# Patient Record
Sex: FEMALE | Race: WHITE | Employment: FULL TIME | URBAN - NONMETROPOLITAN AREA
[De-identification: names, ages, dates, MRNs, and addresses within clinical notes are randomized per-mention and may not be internally consistent; named-entity substitution may affect disease eponyms.]

---

## 2014-12-12 LAB — DIABETIC RETINOPATHY: NEGATIVE

## 2018-02-22 ENCOUNTER — TELEPHONE (OUTPATIENT)
Dept: FAMILY MEDICINE CLINIC | Age: 33
End: 2018-02-22

## 2018-02-22 RX ORDER — OSELTAMIVIR PHOSPHATE 75 MG/1
75 CAPSULE ORAL DAILY
Qty: 10 CAPSULE | Refills: 0 | Status: SHIPPED | OUTPATIENT
Start: 2018-02-22 | End: 2018-03-04

## 2018-02-22 NOTE — TELEPHONE ENCOUNTER
Pt. Called and stated her daughter was diagnosed with the flu and has been taking tamiflu. She was concerned because her daughter has been coughing a lot and vomited on her while she was caring for her. She stated that she heard there was a preventative medication for this and would like to know if something could be called in for her to prevent her from getting the flu. Please advise.

## 2018-03-07 ENCOUNTER — OFFICE VISIT (OUTPATIENT)
Dept: FAMILY MEDICINE CLINIC | Age: 33
End: 2018-03-07

## 2018-03-07 VITALS
SYSTOLIC BLOOD PRESSURE: 116 MMHG | RESPIRATION RATE: 14 BRPM | OXYGEN SATURATION: 98 % | HEIGHT: 65 IN | BODY MASS INDEX: 30.26 KG/M2 | HEART RATE: 82 BPM | DIASTOLIC BLOOD PRESSURE: 74 MMHG | WEIGHT: 181.6 LBS

## 2018-03-07 DIAGNOSIS — E10.9 DM W/O COMPLICATION TYPE I (HCC): Primary | Chronic | ICD-10-CM

## 2018-03-07 DIAGNOSIS — Z00.00 ROUTINE HEALTH MAINTENANCE: ICD-10-CM

## 2018-03-07 LAB
CREATININE URINE POCT: NORMAL
HBA1C MFR BLD: 6.8 %
MICROALBUMIN/CREAT 24H UR: NORMAL MG/G{CREAT}
MICROALBUMIN/CREAT UR-RTO: NORMAL

## 2018-03-07 PROCEDURE — 83036 HEMOGLOBIN GLYCOSYLATED A1C: CPT | Performed by: NURSE PRACTITIONER

## 2018-03-07 PROCEDURE — 99395 PREV VISIT EST AGE 18-39: CPT | Performed by: NURSE PRACTITIONER

## 2018-03-07 PROCEDURE — 82044 UR ALBUMIN SEMIQUANTITATIVE: CPT | Performed by: NURSE PRACTITIONER

## 2018-03-07 RX ORDER — LISINOPRIL 5 MG/1
5 TABLET ORAL DAILY
Qty: 30 TABLET | Refills: 3 | Status: SHIPPED | OUTPATIENT
Start: 2018-03-07 | End: 2019-09-08 | Stop reason: ALTCHOICE

## 2018-03-07 RX ORDER — ATORVASTATIN CALCIUM 10 MG/1
10 TABLET, FILM COATED ORAL DAILY
Qty: 30 TABLET | Refills: 3 | Status: SHIPPED | OUTPATIENT
Start: 2018-03-07 | End: 2019-09-08 | Stop reason: ALTCHOICE

## 2018-03-07 RX ORDER — PEN NEEDLE, DIABETIC 29 G X1/2"
NEEDLE, DISPOSABLE MISCELLANEOUS
Refills: 3 | COMMUNITY
Start: 2018-02-22

## 2018-03-07 ASSESSMENT — PATIENT HEALTH QUESTIONNAIRE - PHQ9
2. FEELING DOWN, DEPRESSED OR HOPELESS: 0
SUM OF ALL RESPONSES TO PHQ9 QUESTIONS 1 & 2: 0
1. LITTLE INTEREST OR PLEASURE IN DOING THINGS: 0
SUM OF ALL RESPONSES TO PHQ QUESTIONS 1-9: 0

## 2018-03-07 ASSESSMENT — ENCOUNTER SYMPTOMS
COUGH: 0
ABDOMINAL PAIN: 0
SHORTNESS OF BREATH: 0

## 2018-03-08 NOTE — ASSESSMENT & PLAN NOTE
Dx 2001. Sees Dr. Karl Borrego at [facility]--appointments every 3-6 months. A1c 6.8. Appointment with endocrinology this week.   Last eye exam through [facility] in October 2017

## 2018-08-01 NOTE — PROGRESS NOTES
Pap results were obtained-scanned to chart-health maintenance  Faxed to [facility] they replied-patient is not there patient according to response
Scanned eye exam to chart-health maintenance updated-faxed to [facility] records-request for pap
mis-transcribed.)

## 2019-03-22 LAB
AVERAGE GLUCOSE: NORMAL
HBA1C MFR BLD: 7.4 %

## 2019-09-08 ENCOUNTER — HOSPITAL ENCOUNTER (EMERGENCY)
Age: 34
Discharge: HOME | End: 2019-09-08
Attending: EMERGENCY MEDICINE
Payer: COMMERCIAL

## 2019-09-08 VITALS
HEIGHT: 65 IN | SYSTOLIC BLOOD PRESSURE: 133 MMHG | HEART RATE: 100 BPM | BODY MASS INDEX: 28.32 KG/M2 | RESPIRATION RATE: 18 BRPM | DIASTOLIC BLOOD PRESSURE: 84 MMHG | WEIGHT: 170 LBS | OXYGEN SATURATION: 98 % | TEMPERATURE: 98.2 F

## 2019-09-08 DIAGNOSIS — O20.9 VAGINAL BLEEDING BEFORE 22 WEEKS GESTATION: Primary | ICD-10-CM

## 2019-09-08 LAB
ALBUMIN SERPL-MCNC: 3.5 GM/DL (ref 3.4–5)
ALP BLD-CCNC: 73 IU/L (ref 40–129)
ALT SERPL-CCNC: 5 U/L (ref 10–40)
ANION GAP SERPL CALCULATED.3IONS-SCNC: 16 MMOL/L (ref 4–16)
ANION GAP SERPL CALCULATED.3IONS-SCNC: ABNORMAL MMOL/L (ref 4–16)
AST SERPL-CCNC: 29 IU/L (ref 15–37)
BASOPHILS ABSOLUTE: 0.1 K/CU MM
BASOPHILS RELATIVE PERCENT: 0.5 % (ref 0–1)
BILIRUB SERPL-MCNC: 0.3 MG/DL (ref 0–1)
BUN BLDV-MCNC: 7 MG/DL (ref 6–23)
CALCIUM SERPL-MCNC: 8.7 MG/DL (ref 8.3–10.6)
CHLORIDE BLD-SCNC: 103 MMOL/L (ref 99–110)
CO2: 17 MMOL/L (ref 21–32)
CO2: ABNORMAL MMOL/L (ref 21–32)
CREAT SERPL-MCNC: 0.6 MG/DL (ref 0.6–1.1)
DIFFERENTIAL TYPE: ABNORMAL
EOSINOPHILS ABSOLUTE: 0.2 K/CU MM
EOSINOPHILS RELATIVE PERCENT: 1.9 % (ref 0–3)
GFR AFRICAN AMERICAN: >60 ML/MIN/1.73M2
GFR NON-AFRICAN AMERICAN: >60 ML/MIN/1.73M2
GLUCOSE BLD-MCNC: 108 MG/DL (ref 70–99)
GONADOTROPIN, CHORIONIC (HCG) QUANT: NORMAL UIU/ML
HCT VFR BLD CALC: 37.5 % (ref 37–47)
HEMOGLOBIN: 12 GM/DL (ref 12.5–16)
IMMATURE NEUTROPHIL %: 0.4 % (ref 0–0.43)
LYMPHOCYTES ABSOLUTE: 2.1 K/CU MM
LYMPHOCYTES RELATIVE PERCENT: 19.6 % (ref 24–44)
MCH RBC QN AUTO: 27.1 PG (ref 27–31)
MCHC RBC AUTO-ENTMCNC: 32 % (ref 32–36)
MCV RBC AUTO: 84.7 FL (ref 78–100)
MONOCYTES ABSOLUTE: 0.6 K/CU MM
MONOCYTES RELATIVE PERCENT: 6.1 % (ref 0–4)
PDW BLD-RTO: 13.2 % (ref 11.7–14.9)
PLATELET # BLD: 226 K/CU MM (ref 140–440)
PMV BLD AUTO: 10.6 FL (ref 7.5–11.1)
POTASSIUM SERPL-SCNC: 3.9 MMOL/L (ref 3.5–5.1)
RBC # BLD: 4.43 M/CU MM (ref 4.2–5.4)
SEGMENTED NEUTROPHILS ABSOLUTE COUNT: 7.5 K/CU MM
SEGMENTED NEUTROPHILS RELATIVE PERCENT: 71.5 % (ref 36–66)
SODIUM BLD-SCNC: 136 MMOL/L (ref 135–145)
TOTAL IMMATURE NEUTOROPHIL: 0.04 K/CU MM
TOTAL PROTEIN: 6.5 GM/DL (ref 6.4–8.2)
WBC # BLD: 10.5 K/CU MM (ref 4–10.5)

## 2019-09-08 PROCEDURE — 99284 EMERGENCY DEPT VISIT MOD MDM: CPT

## 2019-09-08 PROCEDURE — 2580000003 HC RX 258: Performed by: EMERGENCY MEDICINE

## 2019-09-08 PROCEDURE — 85025 COMPLETE CBC W/AUTO DIFF WBC: CPT

## 2019-09-08 PROCEDURE — 80053 COMPREHEN METABOLIC PANEL: CPT

## 2019-09-08 PROCEDURE — 84702 CHORIONIC GONADOTROPIN TEST: CPT

## 2019-09-08 RX ORDER — 0.9 % SODIUM CHLORIDE 0.9 %
500 INTRAVENOUS SOLUTION INTRAVENOUS ONCE
Status: COMPLETED | OUTPATIENT
Start: 2019-09-08 | End: 2019-09-08

## 2019-09-08 RX ADMIN — SODIUM CHLORIDE 500 ML: 9 INJECTION, SOLUTION INTRAVENOUS at 11:02

## 2019-09-08 NOTE — ED NOTES
Pelvic exam done. Pt tolerated well.  remained at bedside.  IVF infusing without any difficulty     Zully Rhodes RN  09/08/19 0021

## 2020-01-24 LAB
AVERAGE GLUCOSE: NORMAL
HBA1C MFR BLD: 5 %

## 2020-11-30 ENCOUNTER — TELEPHONE (OUTPATIENT)
Dept: FAMILY MEDICINE CLINIC | Age: 35
End: 2020-11-30

## 2020-11-30 NOTE — TELEPHONE ENCOUNTER
Please clarify. Is she concerned with postpartum depression? She can schedule an appointment with me for evaluation of postpartum depression. Thank you.

## 2020-11-30 NOTE — TELEPHONE ENCOUNTER
Patient states he delivered a baby 6 months, and is experience \"Postpardum\" . Gyno told her to see her PCP. I was unsure if you want me to schedule or refer back to gyno.     Please Celi Barriga

## 2020-12-03 ENCOUNTER — VIRTUAL VISIT (OUTPATIENT)
Dept: FAMILY MEDICINE CLINIC | Age: 35
End: 2020-12-03
Payer: COMMERCIAL

## 2020-12-03 PROBLEM — F41.8 POSTPARTUM ANXIETY: Status: ACTIVE | Noted: 2020-12-03

## 2020-12-03 PROCEDURE — 99213 OFFICE O/P EST LOW 20 MIN: CPT | Performed by: NURSE PRACTITIONER

## 2020-12-03 RX ORDER — VITAMIN C, CALCIUM, IRON, VITAMIN D3, VITAMIN E, THIAMIN, RIBOFLAVIN, NIACINAMIDE, VITAMIN B6, FOLIC ACID, IODINE, ZINC, COPPER, DOCUSATE SODIUM 120; 124; 27; 400; 30; 3; 3.4; 20; 20; 1; 150; 25; 2 MG/1; MG/1; MG/1; [IU]/1; [IU]/1; MG/1; MG/1; MG/1; MG/1; MG/1; MG/1; MG/1; MG/1
1 TABLET ORAL DAILY
COMMUNITY

## 2020-12-03 RX ORDER — SERTRALINE HYDROCHLORIDE 25 MG/1
25 TABLET, FILM COATED ORAL DAILY
Qty: 30 TABLET | Refills: 3 | Status: SHIPPED | OUTPATIENT
Start: 2020-12-03 | End: 2021-04-12 | Stop reason: SDUPTHER

## 2020-12-03 ASSESSMENT — ANXIETY QUESTIONNAIRES
4. TROUBLE RELAXING: 1-SEVERAL DAYS
GAD7 TOTAL SCORE: 7
5. BEING SO RESTLESS THAT IT IS HARD TO SIT STILL: 0-NOT AT ALL
1. FEELING NERVOUS, ANXIOUS, OR ON EDGE: 2-OVER HALF THE DAYS
6. BECOMING EASILY ANNOYED OR IRRITABLE: 1-SEVERAL DAYS
3. WORRYING TOO MUCH ABOUT DIFFERENT THINGS: 1-SEVERAL DAYS
2. NOT BEING ABLE TO STOP OR CONTROL WORRYING: 1-SEVERAL DAYS
7. FEELING AFRAID AS IF SOMETHING AWFUL MIGHT HAPPEN: 1-SEVERAL DAYS

## 2020-12-03 ASSESSMENT — PATIENT HEALTH QUESTIONNAIRE - PHQ9
SUM OF ALL RESPONSES TO PHQ QUESTIONS 1-9: 2
2. FEELING DOWN, DEPRESSED OR HOPELESS: 2
1. LITTLE INTEREST OR PLEASURE IN DOING THINGS: 0
SUM OF ALL RESPONSES TO PHQ9 QUESTIONS 1 & 2: 2
SUM OF ALL RESPONSES TO PHQ QUESTIONS 1-9: 2
SUM OF ALL RESPONSES TO PHQ QUESTIONS 1-9: 2

## 2020-12-03 ASSESSMENT — ENCOUNTER SYMPTOMS
COUGH: 0
NAUSEA: 0
CONSTIPATION: 0
ABDOMINAL PAIN: 0
VOMITING: 0
SHORTNESS OF BREATH: 0
WHEEZING: 0
CHEST TIGHTNESS: 0
DIARRHEA: 0

## 2020-12-03 NOTE — Clinical Note
Lab work through endocrinology at [facility] we abstract? Supposed to complete in the next week. Thank you.

## 2020-12-03 NOTE — PROGRESS NOTES
12/3/2020 3/7/2018 7/22/2016   PHQ2 Score 2 0 0   PHQ9 Score 2 0 0     Interpretation of Total Score Depression Severity: 1-4 = Minimal depression, 5-9 = Mild depression, 10-14 = Moderate depression, 15-19 = Moderately severe depression, 20-27 = Severe depression  MANJULA 7 SCORE 12/3/2020   MANJULA-7 Total Score 7     Interpretation of MANJULA-7 score: 5-9 = mild anxiety, 10-14 = moderate anxiety, 15+ = severe anxiety. Recommend referral to behavioral health for scores 10 or greater. She is a type 1 diabetic and noticed her glucose levels will increase with stress and anxiety. She is followed by endocrinology. Last appointment was two weeks ago and is supposed to complete lab work. No problem-specific Assessment & Plan notes found for this encounter. Review of Systems   Constitutional: Negative for appetite change, chills, fatigue and unexpected weight change. Respiratory: Negative for cough, chest tightness, shortness of breath and wheezing. Cardiovascular: Negative for chest pain, palpitations and leg swelling. Gastrointestinal: Negative for abdominal pain, constipation, diarrhea, nausea and vomiting. Musculoskeletal: Negative for arthralgias. Neurological: Negative for weakness, numbness and headaches. Hematological: Negative for adenopathy. Psychiatric/Behavioral: Positive for dysphoric mood. Negative for sleep disturbance and suicidal ideas. The patient is nervous/anxious. Prior to Visit Medications    Medication Sig Taking?  Authorizing Provider   sertraline (ZOLOFT) 25 MG tablet Take 1 tablet by mouth daily Yes Lisa Bui, APRN - CNP   HUMALOG 100 UNIT/ML injection vial UP TO 50 UNITS IN DIVIDED DOSES Yes Historical Provider, MD   insulin glargine (LANTUS) 100 UNIT/ML injection vial Inject 25 Units into the skin nightly Yes Historical Provider, MD   Prenamber w/o H-GkLb-UqUj-DSS-FA (CITRANATAL RX) 27-1 MG TABS Take 1 tablet by mouth daily  Historical Provider, MD   Prenatal Vit-Fe Fumarate-FA (PRENATAL VITAMIN PO) Take by mouth  Historical Provider, MD   BD INSULIN SYRINGE ULTRAFINE 31G X 5/16\" 0.3 ML MISC 1 SYRINGE BY SUBCUTANEOUS ROUTE 5 TIMES DAILY. . BD BRAND  Historical Provider, MD   glucose blood VI test strips (FREESTYLE LITE) strip 4 strips by Does not apply route  Historical Provider, MD   glucose blood VI test strips (ONE TOUCH ULTRA TEST) strip Testing 6 times a day  Historical Provider, MD   Insulin Syringes, Disposable, U-100 0.3 ML MISC Inject 1 Syringe into the skin  Historical ProviderMD Richelle LANCETS FINE [address] [place] 1 each into the skin  Historical Provider, MD       No Known Allergies    Social History     Tobacco Use    Smoking status: Never Smoker    Smokeless tobacco: Never Used   Substance Use Topics    Alcohol use: No    Drug use: No      Past Medical History:   Diagnosis Date    Allergic rhinitis     Asthma     Diabetes mellitus type 1, controlled ([address]      Past Surgical History:   Procedure Laterality Date    WISDOM TOOTH EXTRACTION           PHYSICAL EXAMINATION:    Vital Signs: (As obtained by patient/caregiver or practitioner observation)    Blood pressure-  Heart rate-    Respiratory rate-    Temperature-  Pulse oximetry-     Physical Exam  Constitutional:       Appearance: Normal appearance. She is not ill-appearing. HENT:      Head: Normocephalic and atraumatic. Right Ear: Hearing and external ear normal.      Left Ear: Hearing and external ear normal.      Mouth/Throat:      Mouth: Mucous membranes are moist.   Eyes:      Extraocular Movements: Extraocular movements intact. Neck:      Musculoskeletal: Normal range of motion. Pulmonary:      Effort: Pulmonary effort is normal.      Comments: No visualized signs of difficulty breathing  Skin:     Comments: No significant exanthematous lesions or discoloration noted on facial skin   Neurological:      Mental Status: She is alert and oriented to person, place, and time. Cranial Nerves: No facial asymmetry. Psychiatric:         Mood and Affect: Affect normal. Mood is anxious. Speech: Speech normal.         Behavior: Behavior is cooperative. Thought Content: Thought content normal. Thought content does not include homicidal or suicidal ideation. Thought content does not include homicidal or suicidal plan. Other pertinent observable physical exam findings-     Due to this being a TeleHealth encounter, evaluation of the following organ systems is limited: Vitals/Constitutional/EENT/Resp/CV/GI//MS/Neuro/Skin/Heme-Lymph-Imm. ASSESSMENT/PLAN:  1. DM w/o complication type I ([address]  Complete lab work per endocrinology    2. Postpartum anxiety  Start zoloft. Will fill out FMLA for half days again. Continue counseling. Follow up in one month. Earlier PRN. - sertraline (ZOLOFT) 25 MG tablet; Take 1 tablet by mouth daily  Dispense: 30 tablet; Refill: 3      Return in about 1 month (around 1/3/2021), or if symptoms worsen or fail to improve. An  electronic signature was used to authenticate this note. --MARYBETH Fulton - CNP on 12/3/2020 at 3:54 PM             Pursuant to the emergency declaration under the [address] [facility] waiver authority and the [employer] Act, this Virtual  Visit was conducted, with patient's consent, to reduce the patient's risk of exposure to COVID-19 and provide necessary medical care. The patient (and/or legal guardian) has also been advised to contact this office for worsening conditions or problems, and seek emergency medical treatment and/or call 911 if deemed necessary. Services were provided through a video synchronous discussion virtually to substitute for in-person clinic visit.         (Please note that portions of this note may have been completed with a voice recognition program. Efforts were made to edit the dictations but occasionally words aremis-transcribed.)

## 2020-12-03 NOTE — LETTER
Patient Name: uPja Spangler  [birth year]      [facility]  Certification of Health Care Provider for  [facility] Serious Health Condition  (Family and Medical Leave Act)    Attached please find the applicable completed [address] [facility]) certification form. [address] [facility] certification forms (W-380-E, W-380-F, [id number], & WH-385-V). Accordingly, the attached form complies in all material respects with applicable [facility] regulations and is being provided in lieu of any certification forms submitted to [facility] ([facility]) by the Employer. Provider's name: Aiyana Javier, APRN - CNP      [facility]  [address] [address]  Dept: [phone]  Dept Fax: [phone]: [phone]    PART A: MEDICAL FACTS  1. Approximate date condition commenced: March 2020. Probable duration of condition: one year. Matthew below as applicable:  Was the patient admitted for an overnight stay in a hospital, hospice, or residential medical     care facility? No  If so, dates of admission: N/A. Date(s) you treated the patient for condition: 12/3/20. Will the patient need to have treatment visits at least twice per year due to the condition? Yes. Was medication, other than over-the-counter medication, prescribed? Yes     Was the patient referred to other health care provider(s) for evaluation or treatment (e.g.,            physical therapist)? No. If so, state the nature of such treatments and expected          duration of treatment: counseling services. 2. Is the medical condition pregnancy? No. If so, expected delivery date: N/A.    3. Use any information provided by the employer to answer this question.  If the employer fails to provide a list of the employee's essential functions or a job description, answer these             questions based upon the employee's own description of his/her job functions. Is the employee unable to perform any of his/her job functions due to the condition:             No.    If so, Identify the job functions the employee is unable to perform: N/A.    4. Describe other relevant medical facts, if any, related to the condition for which the employee    seeks leave (such medical facts may include symptoms, diagnosis, or any regimen of continuing treatment such as the use of specialized equipment): None. PART B: AMOUNT OF LEAVE NEEDED  5. Will the employee be incapacitated for a single continuous period of time due to his/her medical condition, including any time for treatment and recovery? No.     If so, estimate the beginning and ending dates for the period of incapacity: N/A.    6. Will the employee need to attend follow-up treatment appointments or work part-time or on a reduced schedule because of the employee's medical condition? Yes. If so, are the treatments or the reduced number of hours of work medically necessary? Yes. Estimated treatment schedule, if any, including the dates of any scheduled   appointments and the time required for each appointment, including any recovery period: appointments every 1-4 months. .     Estimate the part-time or reduced work schedule the employee needs, if any: 5 hour(s) per day, 5 day(s) per week from 12/4/20 through 3/30/21.       7. Will the condition cause episodic flare-ups periodically preventing the employee from  performing his/her job functions? No.    Is it medically necessary for the employee to be absent from work during flare-ups? N/A. If so, explain, N/A.               Based upon the patient's medical history and your knowledge of the medical condition, estimate the frequency of flare-ups and the duration of related incapacity that the patient may have over the next 6 months (e.g., 1 episode every 3 months lasting 1-2 days): N/A        __ __ Family Medicine    Sharon Guy  12/3/20

## 2020-12-09 ENCOUNTER — TELEPHONE (OUTPATIENT)
Dept: FAMILY MEDICINE CLINIC | Age: 35
End: 2020-12-09

## 2020-12-09 NOTE — TELEPHONE ENCOUNTER
Can the FMLA under letters be reprinted and emailed to the patient? Please make sure my signature is not cut off on the bottom. Thank you.

## 2020-12-09 NOTE — TELEPHONE ENCOUNTER
Pt called and left a message asking that the [facility] paperwork be resent as the first one did not have a signature.

## 2021-01-04 ENCOUNTER — VIRTUAL VISIT (OUTPATIENT)
Dept: FAMILY MEDICINE CLINIC | Age: 36
End: 2021-01-04
Payer: COMMERCIAL

## 2021-01-04 DIAGNOSIS — L08.9 SKIN INFECTION: Primary | ICD-10-CM

## 2021-01-04 DIAGNOSIS — U07.1 COVID-19: ICD-10-CM

## 2021-01-04 DIAGNOSIS — F41.8 POSTPARTUM ANXIETY: ICD-10-CM

## 2021-01-04 DIAGNOSIS — E10.9 DM W/O COMPLICATION TYPE I (HCC): Chronic | ICD-10-CM

## 2021-01-04 PROCEDURE — 99214 OFFICE O/P EST MOD 30 MIN: CPT | Performed by: NURSE PRACTITIONER

## 2021-01-04 RX ORDER — SULFAMETHOXAZOLE AND TRIMETHOPRIM 800; 160 MG/1; MG/1
1 TABLET ORAL 2 TIMES DAILY
Qty: 20 TABLET | Refills: 0 | Status: SHIPPED | OUTPATIENT
Start: 2021-01-04 | End: 2021-01-14

## 2021-01-04 ASSESSMENT — ENCOUNTER SYMPTOMS
CONSTIPATION: 0
VOMITING: 0
WHEEZING: 0
SORE THROAT: 0
RHINORRHEA: 0
ABDOMINAL PAIN: 0
CHEST TIGHTNESS: 1
DIARRHEA: 0
COUGH: 1
SHORTNESS OF BREATH: 1
NAUSEA: 0

## 2021-01-04 ASSESSMENT — PATIENT HEALTH QUESTIONNAIRE - PHQ9
2. FEELING DOWN, DEPRESSED OR HOPELESS: 1
SUM OF ALL RESPONSES TO PHQ9 QUESTIONS 1 & 2: 1
SUM OF ALL RESPONSES TO PHQ QUESTIONS 1-9: 1

## 2021-01-04 NOTE — PROGRESS NOTES
Hannah Alvarez (:  1985) is a 38 y.o. female,Established patient, here for evaluation of the following chief complaint(s): Anxiety and Positive For Covid-19      ASSESSMENT/PLAN:  1. Skin infection  -     sulfamethoxazole-trimethoprim (BACTRIM DS;SEPTRA DS) 800-160 MG per tablet; Take 1 tablet by mouth 2 times daily for 10 days, Disp-20 tablet, R-0Normal  2. COVID-19  3. DM w/o complication type I ([address]  4. Postpartum anxiety    Recommend she continue current vitamins and tylenol PRN. Discussed risk vs benefit of aspirin 81 mg since she is currently breastfeeding. Recommend mucinex PRN. Encouraged regular activity, proning at night. ED for severe worsening symptoms. Continue to monitor blood sugar. Will prescribe bactrim for skin infection--needs to follow up if not improving. Will plan to continue zoloft at this time. Return in about 3 months (around 2021), or if symptoms worsen or fail to improve. SUBJECTIVE/OBJECTIVE:  HPI       Patient states she is currently positive for COVID.  and stepson were also positive. Symptom onset 20. Started with loss of taste and smell. Complains of mild cough, chest tightness, occasional SOB, intermittent myalgia, headache. Denies fever, wheezing, n/v/d, rhinorrhea, sore throat. She had congestion that has resolved. Appetite remains normal. She has been taking Vitamin C, D, B and zinc. She has taken tylenol PRN. Blood sugars running higher than usual since she has been sick. She has been able to manage them. Highest reading was 250. Spot on breast she had noticed 1-2 weeks prior. States on 20 noticed purulent drainage. Went to urgent care. Cultured and was positive for staph. She was treated with an antibiotic. Noticed today that opposite breast is tender. Red spot on right breast 2-3 inches in diameter. Tender to touch. Occasional itching. No swelling. She was prescribed keflex without complete resolution.      Anxiety was improved Pt called with questions re: insurance and 11/28/17 procedure. Please call. until  and herself positive for covid. She has continued zoloft. FMLA completed at last appointment. States HR asking her about a diagnosis, but patient not comfortable disclosing with work. Review of Systems   Constitutional: Negative for appetite change, chills, fatigue, fever and unexpected weight change. HENT: Positive for congestion. Negative for rhinorrhea and sore throat. Respiratory: Positive for cough, chest tightness and shortness of breath. Negative for wheezing. Cardiovascular: Negative for chest pain, palpitations and leg swelling. Gastrointestinal: Negative for abdominal pain, constipation, diarrhea, nausea and vomiting. Musculoskeletal: Positive for myalgias. Negative for arthralgias. Neurological: Positive for headaches. Negative for weakness and numbness. Hematological: Negative for adenopathy. No flowsheet data found. Physical Exam  Constitutional:       Appearance: Normal appearance. She is not ill-appearing. HENT:      Head: Normocephalic and atraumatic. Right Ear: Hearing and external ear normal.      Left Ear: Hearing and external ear normal.      Mouth/Throat:      Mouth: Mucous membranes are moist.   Eyes:      Extraocular Movements: Extraocular movements intact. Neck:      Musculoskeletal: Normal range of motion. Pulmonary:      Effort: Pulmonary effort is normal.      Comments: No visualized signs of difficulty breathing  Skin:     Comments: No significant exanthematous lesions or discoloration noted on facial skin    2-3 inch erythematous area to right breast   Neurological:      Mental Status: She is alert and oriented to person, place, and time. Cranial Nerves: No facial asymmetry. Psychiatric:         Mood and Affect: Affect normal. Mood is anxious. Mood is not depressed. Speech: Speech normal.         Behavior: Behavior is cooperative. Thought Content:  Thought content normal. Thought content does not include homicidal or suicidal ideation. Thought content does not include homicidal or suicidal plan. Juan Oliver is a 38 y.o. female being evaluated by a Virtual Visit (video visit) encounter to address concerns as mentioned above. A caregiver was present when appropriate. Due to this being a TeleHealth encounter (During KSABL-09 public health emergency), evaluation of the following organ systems was limited: Vitals/Constitutional/EENT/Resp/CV/GI//MS/Neuro/Skin/Heme-Lymph-Imm. Pursuant to the emergency declaration under the [address] [place] [address], [address] [place] [address] authority and the [employer] Act, this Virtual Visit was conducted with patient's (and/or legal guardian's) consent, to reduce the patient's risk of exposure to COVID-19 and provide necessary medical care. The patient (and/or legal guardian) has also been advised to contact this office for worsening conditions or problems, and seek emergency medical treatment and/or call 911 if deemed necessary. Patient identification was verified at the start of the visit: Yes      Services were provided through a video synchronous discussion virtually to substitute for in-person clinic visit. Patient and provider were located at their individual homes. An electronic signature was used to authenticate this note.     --MARYBETH Chan - CNP

## 2021-01-14 ENCOUNTER — TELEPHONE (OUTPATIENT)
Dept: FAMILY MEDICINE CLINIC | Age: 36
End: 2021-01-14

## 2021-01-14 NOTE — TELEPHONE ENCOUNTER
Patient called-lm on vm-regarding her fmla-not sure what the patient is requesting exactly-she is wanting certain things documented.-I returned the patients call and lm on her vm-instructed to call the office regarding this issue-it also looks like you have a mychart message regarding this patient

## 2021-04-12 ENCOUNTER — OFFICE VISIT (OUTPATIENT)
Dept: FAMILY MEDICINE CLINIC | Age: 36
End: 2021-04-12
Payer: COMMERCIAL

## 2021-04-12 VITALS
RESPIRATION RATE: 16 BRPM | SYSTOLIC BLOOD PRESSURE: 122 MMHG | TEMPERATURE: 97.3 F | HEART RATE: 75 BPM | DIASTOLIC BLOOD PRESSURE: 78 MMHG | BODY MASS INDEX: 33.78 KG/M2 | WEIGHT: 203 LBS | OXYGEN SATURATION: 98 %

## 2021-04-12 DIAGNOSIS — F41.8 POSTPARTUM ANXIETY: ICD-10-CM

## 2021-04-12 DIAGNOSIS — N64.59 BREAST COMPLAINT: ICD-10-CM

## 2021-04-12 DIAGNOSIS — E10.9 DM W/O COMPLICATION TYPE I (HCC): Primary | ICD-10-CM

## 2021-04-12 DIAGNOSIS — Z13.220 SCREENING FOR HYPERLIPIDEMIA: ICD-10-CM

## 2021-04-12 DIAGNOSIS — L08.9 SKIN INFECTION: ICD-10-CM

## 2021-04-12 LAB
CREATININE URINE POCT: 10
HBA1C MFR BLD: 7.1 %
MICROALBUMIN/CREAT 24H UR: 10 MG/G{CREAT}
MICROALBUMIN/CREAT UR-RTO: <30

## 2021-04-12 PROCEDURE — 99214 OFFICE O/P EST MOD 30 MIN: CPT | Performed by: NURSE PRACTITIONER

## 2021-04-12 PROCEDURE — 83036 HEMOGLOBIN GLYCOSYLATED A1C: CPT | Performed by: NURSE PRACTITIONER

## 2021-04-12 PROCEDURE — 3051F HG A1C>EQUAL 7.0%<8.0%: CPT | Performed by: NURSE PRACTITIONER

## 2021-04-12 PROCEDURE — 82044 UR ALBUMIN SEMIQUANTITATIVE: CPT | Performed by: NURSE PRACTITIONER

## 2021-04-12 RX ORDER — SERTRALINE HYDROCHLORIDE 25 MG/1
25 TABLET, FILM COATED ORAL DAILY
Qty: 90 TABLET | Refills: 1 | Status: SHIPPED | OUTPATIENT
Start: 2021-04-12

## 2021-04-12 ASSESSMENT — ENCOUNTER SYMPTOMS
CONSTIPATION: 0
COLOR CHANGE: 1
SHORTNESS OF BREATH: 0
ABDOMINAL PAIN: 0
COUGH: 0
DIARRHEA: 0
VOMITING: 0
NAUSEA: 0
CHEST TIGHTNESS: 0
WHEEZING: 0

## 2021-04-12 ASSESSMENT — ANXIETY QUESTIONNAIRES
7. FEELING AFRAID AS IF SOMETHING AWFUL MIGHT HAPPEN: 0-NOT AT ALL
2. NOT BEING ABLE TO STOP OR CONTROL WORRYING: 0-NOT AT ALL
6. BECOMING EASILY ANNOYED OR IRRITABLE: 0-NOT AT ALL
GAD7 TOTAL SCORE: 2
4. TROUBLE RELAXING: 0-NOT AT ALL
3. WORRYING TOO MUCH ABOUT DIFFERENT THINGS: 1-SEVERAL DAYS

## 2021-04-12 ASSESSMENT — PATIENT HEALTH QUESTIONNAIRE - PHQ9
SUM OF ALL RESPONSES TO PHQ9 QUESTIONS 1 & 2: 0
2. FEELING DOWN, DEPRESSED OR HOPELESS: 0
1. LITTLE INTEREST OR PLEASURE IN DOING THINGS: 0

## 2021-04-12 NOTE — ASSESSMENT & PLAN NOTE
Mood doing well with zoloft. She is back to work full time. MANJULA 7 SCORE 4/12/2021 12/3/2020   MANJULA-7 Total Score 2 7     Interpretation of MANJULA-7 score: 5-9 = mild anxiety, 10-14 = moderate anxiety, 15+ = severe anxiety. Recommend referral to behavioral health for scores 10 or greater.

## 2021-04-12 NOTE — PROGRESS NOTES
SUBJECTIVE:    Ashley Amezcua  1985  38 y.o.  female      Chief Complaint   Patient presents with    Other     spot on chest    Diabetes     HPI       No Known Allergies    Current Outpatient Medications   Medication Sig Dispense Refill    mupirocin (BACTROBAN) 2 % ointment Apply 3 times daily for one week 1 Tube 0    sertraline (ZOLOFT) 25 MG tablet Take 1 tablet by mouth daily 90 tablet 1    Prenat w/o D-ReDt-MzGm-DSS-FA (CITRANATAL RX) 27-1 MG TABS Take 1 tablet by mouth daily      HUMALOG 100 UNIT/ML injection vial UP TO 50 UNITS IN DIVIDED DOSES  3    insulin glargine (LANTUS) 100 UNIT/ML injection vial Inject 25 Units into the skin nightly      Prenatal Vit-Fe Fumarate-FA (PRENATAL VITAMIN PO) Take by mouth      BD INSULIN SYRINGE ULTRAFINE 31G X 5/16\" 0.3 ML MISC 1 SYRINGE BY SUBCUTANEOUS ROUTE 5 TIMES DAILY. . BD BRAND  3    glucose blood VI test strips (FREESTYLE LITE) strip 4 strips by Does not apply route      glucose blood VI test strips (ONE TOUCH ULTRA TEST) strip Testing 6 times a day      Insulin Syringes, Disposable, U-100 0.3 ML MISC Inject 1 Syringe into the skin      ONETOUCH DELICA LANCETS FINE MISC Inject 1 each into the skin       No current facility-administered medications for this visit.            Past Medical History:   Diagnosis Date    Allergic rhinitis     Asthma     Diabetes mellitus type 1, controlled ([address]      Past Surgical History:   Procedure Laterality Date    WISDOM TOOTH EXTRACTION       Social History     Socioeconomic History    Marital status:      Spouse name: None    Number of children: None    Years of education: None    Highest education level: None   Occupational History    None   Social Needs    Financial resource strain: None    Food insecurity     Worry: None     Inability: None    Transportation needs     Medical: None     Non-medical: None   Tobacco Use    Smoking status: Never Smoker    Smokeless tobacco: Never Used Substance and Sexual Activity    Alcohol use: No    Drug use: No    Sexual activity: Yes     Partners: Male   Lifestyle    Physical activity     Days per week: None     Minutes per session: None    Stress: None   Relationships    Social connections     Talks on phone: None     Gets together: None     Attends Anabaptist service: None     Active member of club or organization: None     Attends meetings of clubs or organizations: None     Relationship status: None    Intimate partner violence     Fear of current or ex partner: None     Emotionally abused: None     Physically abused: None     Forced sexual activity: None   Other Topics Concern    None   Social History Narrative    None     Continues to have issues with are on left breast. Went to urgent care in November and was prescribed keflex. Virtual appointment in January and completed bactrim. She felt area improved. Originally was the size of a quarter with a white center. Denies pain, itching, drainage. She is still nursing her daughter 3-4 times a day. DM w/o complication type I [facility])  Her last appointments with endocrinology were virtual. She has not had A1c completed in some time. No polyuria, polydipsia, polyphagia. Postpartum anxiety  Mood doing well with zoloft. She is back to work full time. MANJULA 7 SCORE 4/12/2021 12/3/2020   MANJULA-7 Total Score 2 7     Interpretation of MANJULA-7 score: 5-9 = mild anxiety, 10-14 = moderate anxiety, 15+ = severe anxiety. Recommend referral to behavioral health for scores 10 or greater. Review of Systems   Constitutional: Negative for appetite change, chills, fatigue and unexpected weight change. Respiratory: Negative for cough, chest tightness, shortness of breath and wheezing. Cardiovascular: Negative for chest pain, palpitations and leg swelling. Gastrointestinal: Negative for abdominal pain, constipation, diarrhea, nausea and vomiting. Musculoskeletal: Negative for arthralgias.    Skin: Positive for color change. Neurological: Negative for weakness, numbness and headaches. Hematological: Negative for adenopathy. Psychiatric/Behavioral: Negative for dysphoric mood and suicidal ideas. The patient is not nervous/anxious. OBJECTIVE:    /78 (Site: Right Upper Arm, Position: Sitting, Cuff Size: Large Adult)   Pulse 75   Temp 97.3 °F (36.3 °C)   Resp 16   Wt 203 lb (92.1 kg)   LMP 03/15/2021 (Within Days)   SpO2 98%   Breastfeeding Unknown   BMI 33.78 kg/m²     Physical Exam  Constitutional:       Appearance: Normal appearance. She is well-developed. HENT:      Head: Normocephalic and atraumatic. Right Ear: Hearing normal.      Left Ear: Hearing normal.      Nose: Nose normal.      Mouth/Throat:      Mouth: Mucous membranes are moist.   Neck:      Musculoskeletal: Normal range of motion and neck supple. Cardiovascular:      Rate and Rhythm: Normal rate and regular rhythm. Pulses:           Dorsalis pedis pulses are 2+ on the right side and 2+ on the left side. Posterior tibial pulses are 2+ on the right side and 2+ on the left side. Heart sounds: No murmur. No friction rub. No gallop. Pulmonary:      Effort: Pulmonary effort is normal.      Breath sounds: Normal breath sounds. No wheezing, rhonchi or rales. Abdominal:      Palpations: Abdomen is soft. Musculoskeletal: Normal range of motion. Feet:      Right foot:      Protective Sensation: 10 sites tested. 10 sites sensed. Skin integrity: Skin integrity normal.      Toenail Condition: Right toenails are normal.      Left foot:      Protective Sensation: 10 sites tested. 10 sites sensed. Skin integrity: Skin integrity normal.      Toenail Condition: Left toenails are normal.   Skin:     General: Skin is warm and dry. Comments: Spot on underside of left breast approximately 1.5 cm in diameter, red. No drainage, tenderness. Neurological:      General: No focal deficit present. Mental Status: She is alert and oriented to person, place, and time. Psychiatric:         Mood and Affect: Mood and affect normal.         Behavior: Behavior normal. Behavior is cooperative. Thought Content: Thought content normal. Thought content does not include homicidal or suicidal ideation. Thought content does not include homicidal or suicidal plan. ASSESSMENT/PLAN:    1. Screening for hyperlipidemia  - Lipid, Fasting; Future    2. DM w/o complication type I (HCC)  Monitor glucose. Follow up with endocrinology  -  DIABETES FOOT EXAM  - POCT glycosylated hemoglobin (Hb A1C)  - POCT microalbumin  - COMPREHENSIVE METABOLIC PANEL; Future    3. Skin infection  bactroban as ordered. Ultrasound as ordered. Discussed possible derm referral if not improving  - mupirocin (BACTROBAN) 2 % ointment; Apply 3 times daily for one week  Dispense: 1 Tube; Refill: 0  - MARANDA US BREAST LEFT COMPLETE; Future    4. Postpartum anxiety  Continue zoloft  - CBC WITH AUTO DIFFERENTIAL; Future  - COMPREHENSIVE METABOLIC PANEL; Future  - sertraline (ZOLOFT) 25 MG tablet; Take 1 tablet by mouth daily  Dispense: 90 tablet; Refill: 1    5. Breast complaint  - MARANDA US BREAST LEFT COMPLETE; Future               Return in about 4 months (around 8/12/2021), or if symptoms worsen or fail to improve.       (Please note that portions of this note may have been completed with a voice recognition program. Efforts were made to edit the dictations but occasionally words aremis-transcribed.)

## 2021-04-12 NOTE — ASSESSMENT & PLAN NOTE
Her last appointments with endocrinology were virtual. She has not had A1c completed in some time. No polyuria, polydipsia, polyphagia.

## 2021-08-18 ENCOUNTER — VIRTUAL VISIT (OUTPATIENT)
Dept: FAMILY MEDICINE CLINIC | Age: 36
End: 2021-08-18
Payer: COMMERCIAL

## 2021-08-18 DIAGNOSIS — N61.0 MASTITIS: Primary | ICD-10-CM

## 2021-08-18 PROCEDURE — 99213 OFFICE O/P EST LOW 20 MIN: CPT | Performed by: NURSE PRACTITIONER

## 2021-08-18 RX ORDER — SULFAMETHOXAZOLE AND TRIMETHOPRIM 800; 160 MG/1; MG/1
1 TABLET ORAL 2 TIMES DAILY
Qty: 14 TABLET | Refills: 0 | Status: SHIPPED | OUTPATIENT
Start: 2021-08-18 | End: 2021-08-25

## 2021-08-18 ASSESSMENT — ENCOUNTER SYMPTOMS
ABDOMINAL PAIN: 0
NAUSEA: 0
DIARRHEA: 0
WHEEZING: 0
CONSTIPATION: 0
CHEST TIGHTNESS: 0
VOMITING: 0
SHORTNESS OF BREATH: 0
COUGH: 0

## 2021-08-18 NOTE — PROGRESS NOTES
2021    TELEHEALTH EVALUATION -- Audio/Visual (During YDYVW-61 public health emergency)    Due to COVID-19 related state of emergency restrictions , as an alternative to an in-person session, the clinical decision was made to utilize a virtual visit to provide services for this patient's visit. These services were provided via [employer]. me with the patient in their office while I was located at [place] [address] and [facility]. Identity was confirmed via patient name and . Verbal consent for use of telehealth was provided to and completed by the patient. HPI:    [facility] (:  1985) has requested an audio/video evaluation for the following concern(s):    Chief Complaint   Patient presents with    Breast Problem       In the Fall had a staph infection in her left breast and was seen at urgent care. It did not clear with antibiotics and was then seen by PCP. She had additional antibiotic prescribed with improvement in symptoms. She did have a spot on her left breast that remained where the infection was located and an ultrasound was ordered. She states that she did not complete the ultrasound due to issues with scheduling. Onset 4 days ago noticed area on right breast under her aerola is swollen with a small amount of firmness. Painful when touched. She has not been nursing on that side due to pain and has been hand compressing to express milk. She is still nursing three times a day. She has not noticed any cracking, drainage or redness to her nipple. No previous pain with nursing. Area is the size of half the length of her thumb. Denies fever, chills, engorgement. Symptoms have remained constant. No problem-specific Assessment & Plan notes found for this encounter. Review of Systems   Constitutional: Negative for appetite change, chills, fatigue, fever and unexpected weight change. Respiratory: Negative for cough, chest tightness, shortness of breath and wheezing. Cardiovascular: Negative for chest pain, palpitations and leg swelling. Gastrointestinal: Negative for abdominal pain, constipation, diarrhea, nausea and vomiting. Musculoskeletal: Negative for arthralgias. Neurological: Negative for weakness, numbness and headaches. Hematological: Negative for adenopathy. Prior to Visit Medications    Medication Sig Taking? Authorizing Provider   sulfamethoxazole-trimethoprim (BACTRIM DS;SEPTRA DS) 800-160 MG per tablet Take 1 tablet by mouth 2 times daily for 7 days Yes MARYBETH Michelle CNP   sertraline (ZOLOFT) 25 MG tablet Take 1 tablet by mouth daily  Jieuparnold Alanis APRN - CNP   Prenat w/o D-XiJd-GaLj-DSS-FA (CITRANATAL RX) 27-1 MG TABS Take 1 tablet by mouth daily  Historical Provider, MD   Prenatal Vit-Fe Fumarate-FA (PRENATAL VITAMIN PO) Take by mouth  Historical Provider, MD   HUMALOG 100 UNIT/ML injection vial UP TO 50 UNITS IN DIVIDED DOSES  Historical Provider, MD   BD INSULIN SYRINGE ULTRAFINE 31G X 5/16\" 0.3 ML MISC 1 SYRINGE BY SUBCUTANEOUS ROUTE 5 TIMES DAILY. . BD BRAND  Historical Provider, MD   glucose blood VI test strips (FREESTYLE LITE) strip 4 strips by Does not apply route  Historical Provider, MD   glucose blood VI test strips (ONE TOUCH ULTRA TEST) strip Testing 6 times a day  Historical Provider, MD   insulin glargine (LANTUS) 100 UNIT/ML injection vial Inject 25 Units into the skin nightly  Historical Provider, MD   Insulin Syringes, Disposable, U-100 0.3 ML MISC Inject 1 Syringe into the skin  Historical ProviderMD Melany LANCETS FINE [address] [place] 1 each into the skin  Historical Provider, MD       No Known Allergies    Social History     Tobacco Use    Smoking status: Never Smoker    Smokeless tobacco: Never Used   Vaping Use    Vaping Use: Never used   Substance Use Topics    Alcohol use: No    Drug use: No      Past Medical History:   Diagnosis Date    Allergic rhinitis     Asthma     [facility]      Past Surgical History:   Procedure Laterality Date    WISDOM TOOTH EXTRACTION           PHYSICAL EXAMINATION:    Vital Signs: (As obtained by patient/caregiver or practitioner observation)    Blood pressure-  Heart rate-    Respiratory rate-    Temperature-  Pulse oximetry-     Physical Exam  Constitutional:       Appearance: Normal appearance. She is not ill-appearing. HENT:      Head: Normocephalic and atraumatic. Right Ear: Hearing and external ear normal.      Left Ear: Hearing and external ear normal.      Mouth/Throat:      Mouth: Mucous membranes are moist.   Eyes:      Extraocular Movements: Extraocular movements intact. Pulmonary:      Effort: Pulmonary effort is normal.      Comments: No visualized signs of difficulty breathing  Chest:          Comments: No obvious redness, swelling or drainage upon visual inspection. Size and location of tender area drawn in as shown by patient  Musculoskeletal:      Cervical back: Normal range of motion. Skin:     Comments: No significant exanthematous lesions or discoloration noted on facial skin   Neurological:      Mental Status: She is alert and oriented to person, place, and time. Cranial Nerves: No facial asymmetry. Psychiatric:         Mood and Affect: Mood and affect normal.         Speech: Speech normal.         Behavior: Behavior is cooperative. Thought Content: Thought content normal.         Other pertinent observable physical exam findings-     Due to this being a TeleHealth encounter, evaluation of the following organ systems is limited: Vitals/Constitutional/EENT/Resp/CV/GI//MS/Neuro/Skin/Heme-Lymph-Imm. ASSESSMENT/PLAN:  1. Mastitis  Discussed compresses, expression of breast milk. If not improving start antibiotic. If no resolution recommend follow up with PCP or OB/GYN. Discussed possible ultrasound if no resolution. - sulfamethoxazole-trimethoprim (BACTRIM DS;SEPTRA DS) 800-160 MG per tablet;  Take 1 tablet by mouth 2 times daily for 7 days  Dispense: 14 tablet; Refill: 0      Return if symptoms worsen or fail to improve. An  electronic signature was used to authenticate this note. --MARYBETH Nick CNP on 8/18/2021 at 1:00 PM             Pursuant to the emergency declaration under the [address] [place] [address] [facility] authority and the [employer] and [employer] Act, this Virtual  Visit was conducted, with patient's consent, to reduce the patient's risk of exposure to COVID-19 and provide necessary medical care. The patient (and/or legal guardian) has also been advised to contact this office for worsening conditions or problems, and seek emergency medical treatment and/or call 911 if deemed necessary. Services were provided through a video synchronous discussion virtually to substitute for in-person clinic visit.         (Please note that portions of this note may have been completed with a voice recognition program. Efforts were made to edit the dictations but occasionally words aremis-transcribed.)

## 2023-02-21 ENCOUNTER — HOSPITAL ENCOUNTER (EMERGENCY)
Age: 38
Discharge: ANOTHER HOSPITAL | End: 2023-02-21
Attending: EMERGENCY MEDICINE
Payer: COMMERCIAL

## 2023-02-21 ENCOUNTER — APPOINTMENT (OUTPATIENT)
Dept: CT IMAGING | Age: 38
End: 2023-02-21
Payer: COMMERCIAL

## 2023-02-21 VITALS
DIASTOLIC BLOOD PRESSURE: 66 MMHG | SYSTOLIC BLOOD PRESSURE: 95 MMHG | HEIGHT: 65 IN | HEART RATE: 119 BPM | OXYGEN SATURATION: 99 % | RESPIRATION RATE: 19 BRPM | TEMPERATURE: 98.2 F | WEIGHT: 182 LBS | BODY MASS INDEX: 30.32 KG/M2

## 2023-02-21 DIAGNOSIS — I30.9 PERICARDIAL EFFUSION, ACUTE: Primary | ICD-10-CM

## 2023-02-21 DIAGNOSIS — E16.2 HYPOGLYCEMIA: ICD-10-CM

## 2023-02-21 DIAGNOSIS — J90 PLEURAL EFFUSION: ICD-10-CM

## 2023-02-21 DIAGNOSIS — C50.912 MALIGNANT NEOPLASM OF LEFT FEMALE BREAST, UNSPECIFIED ESTROGEN RECEPTOR STATUS, UNSPECIFIED SITE OF BREAST (HCC): ICD-10-CM

## 2023-02-21 LAB
ALBUMIN SERPL-MCNC: 3.2 GM/DL (ref 3.4–5)
ALP BLD-CCNC: 123 IU/L (ref 40–129)
ALT SERPL-CCNC: 85 U/L (ref 10–40)
ANION GAP SERPL CALCULATED.3IONS-SCNC: 11 MMOL/L (ref 4–16)
AST SERPL-CCNC: 135 IU/L (ref 15–37)
BACTERIA: NEGATIVE /HPF
BASOPHILS ABSOLUTE: 0 K/CU MM
BASOPHILS RELATIVE PERCENT: 1.4 % (ref 0–1)
BILIRUB SERPL-MCNC: 0.4 MG/DL (ref 0–1)
BILIRUBIN URINE: NEGATIVE MG/DL
BLOOD, URINE: ABNORMAL
BUN SERPL-MCNC: 18 MG/DL (ref 6–23)
CALCIUM SERPL-MCNC: 8.1 MG/DL (ref 8.3–10.6)
CAST TYPE: NORMAL /HPF
CHLORIDE BLD-SCNC: 104 MMOL/L (ref 99–110)
CHP ED QC CHECK: NORMAL
CLARITY: CLEAR
CO2: 23 MMOL/L (ref 21–32)
COLOR: YELLOW
CREAT SERPL-MCNC: 0.6 MG/DL (ref 0.6–1.1)
CRYSTAL TYPE: NEGATIVE /HPF
DIFFERENTIAL TYPE: ABNORMAL
EKG ATRIAL RATE: 126 BPM
EKG DIAGNOSIS: NORMAL
EKG P AXIS: 45 DEGREES
EKG P-R INTERVAL: 126 MS
EKG Q-T INTERVAL: 304 MS
EKG QRS DURATION: 98 MS
EKG QTC CALCULATION (BAZETT): 440 MS
EKG R AXIS: 63 DEGREES
EKG T AXIS: 11 DEGREES
EKG VENTRICULAR RATE: 126 BPM
EOSINOPHILS ABSOLUTE: 0 K/CU MM
EOSINOPHILS RELATIVE PERCENT: 1.4 % (ref 0–3)
EPITHELIAL CELLS, UA: 2 /HPF
GFR SERPL CREATININE-BSD FRML MDRD: >60 ML/MIN/1.73M2
GLUCOSE BLD-MCNC: 103 MG/DL
GLUCOSE BLD-MCNC: 103 MG/DL (ref 70–99)
GLUCOSE BLD-MCNC: 42 MG/DL
GLUCOSE BLD-MCNC: 42 MG/DL (ref 70–99)
GLUCOSE SERPL-MCNC: 244 MG/DL (ref 70–99)
GLUCOSE, URINE: NEGATIVE MG/DL
HCT VFR BLD CALC: 29.9 % (ref 37–47)
HEMOGLOBIN: 9.4 GM/DL (ref 12.5–16)
IMMATURE NEUTROPHIL %: 0.4 % (ref 0–0.43)
KETONES, URINE: NEGATIVE MG/DL
LEUKOCYTE ESTERASE, URINE: NEGATIVE
LYMPHOCYTES ABSOLUTE: 1.2 K/CU MM
LYMPHOCYTES RELATIVE PERCENT: 43.3 % (ref 24–44)
MCH RBC QN AUTO: 28.7 PG (ref 27–31)
MCHC RBC AUTO-ENTMCNC: 31.4 % (ref 32–36)
MCV RBC AUTO: 91.2 FL (ref 78–100)
MONOCYTES ABSOLUTE: 0.1 K/CU MM
MONOCYTES RELATIVE PERCENT: 4.3 % (ref 0–4)
NITRITE URINE, QUANTITATIVE: NEGATIVE
PDW BLD-RTO: 17.5 % (ref 11.7–14.9)
PH, URINE: 5.5 (ref 5–8)
PLATELET # BLD: 153 K/CU MM (ref 140–440)
PMV BLD AUTO: 11.4 FL (ref 7.5–11.1)
POTASSIUM SERPL-SCNC: 4.1 MMOL/L (ref 3.5–5.1)
PRO-BNP: 5600 PG/ML
PROTEIN UA: NEGATIVE MG/DL
RBC # BLD: 3.28 M/CU MM (ref 4.2–5.4)
RBC URINE: 5 /HPF (ref 0–6)
SEGMENTED NEUTROPHILS ABSOLUTE COUNT: 1.4 K/CU MM
SEGMENTED NEUTROPHILS RELATIVE PERCENT: 49.2 % (ref 36–66)
SODIUM BLD-SCNC: 138 MMOL/L (ref 135–145)
SPECIFIC GRAVITY UA: 1.01 (ref 1–1.03)
TOTAL IMMATURE NEUTOROPHIL: 0.01 K/CU MM
TOTAL PROTEIN: 5.3 GM/DL (ref 6.4–8.2)
TROPONIN T: 0.15 NG/ML
UROBILINOGEN, URINE: 0.2 MG/DL (ref 0.2–1)
WBC # BLD: 2.8 K/CU MM (ref 4–10.5)
WBC UA: 2 /HPF (ref 0–5)

## 2023-02-21 PROCEDURE — 2500000003 HC RX 250 WO HCPCS: Performed by: EMERGENCY MEDICINE

## 2023-02-21 PROCEDURE — 71275 CT ANGIOGRAPHY CHEST: CPT

## 2023-02-21 PROCEDURE — 96374 THER/PROPH/DIAG INJ IV PUSH: CPT

## 2023-02-21 PROCEDURE — 6370000000 HC RX 637 (ALT 250 FOR IP): Performed by: EMERGENCY MEDICINE

## 2023-02-21 PROCEDURE — 84484 ASSAY OF TROPONIN QUANT: CPT

## 2023-02-21 PROCEDURE — 85025 COMPLETE CBC W/AUTO DIFF WBC: CPT

## 2023-02-21 PROCEDURE — 81001 URINALYSIS AUTO W/SCOPE: CPT

## 2023-02-21 PROCEDURE — 82962 GLUCOSE BLOOD TEST: CPT

## 2023-02-21 PROCEDURE — 99285 EMERGENCY DEPT VISIT HI MDM: CPT

## 2023-02-21 PROCEDURE — 80053 COMPREHEN METABOLIC PANEL: CPT

## 2023-02-21 PROCEDURE — 6360000002 HC RX W HCPCS: Performed by: EMERGENCY MEDICINE

## 2023-02-21 PROCEDURE — 6360000004 HC RX CONTRAST MEDICATION: Performed by: EMERGENCY MEDICINE

## 2023-02-21 PROCEDURE — 93005 ELECTROCARDIOGRAM TRACING: CPT | Performed by: EMERGENCY MEDICINE

## 2023-02-21 PROCEDURE — 83880 ASSAY OF NATRIURETIC PEPTIDE: CPT

## 2023-02-21 PROCEDURE — 93010 ELECTROCARDIOGRAM REPORT: CPT | Performed by: INTERNAL MEDICINE

## 2023-02-21 RX ORDER — DEXTROSE MONOHYDRATE 25 G/50ML
25 INJECTION, SOLUTION INTRAVENOUS ONCE
Status: COMPLETED | OUTPATIENT
Start: 2023-02-21 | End: 2023-02-21

## 2023-02-21 RX ORDER — ASPIRIN 81 MG/1
324 TABLET, CHEWABLE ORAL ONCE
Status: COMPLETED | OUTPATIENT
Start: 2023-02-21 | End: 2023-02-21

## 2023-02-21 RX ORDER — FUROSEMIDE 20 MG/1
20 TABLET ORAL 2 TIMES DAILY
COMMUNITY
Start: 2023-02-20 | End: 2023-02-25

## 2023-02-21 RX ORDER — FUROSEMIDE 10 MG/ML
40 INJECTION INTRAMUSCULAR; INTRAVENOUS ONCE
Status: COMPLETED | OUTPATIENT
Start: 2023-02-21 | End: 2023-02-21

## 2023-02-21 RX ADMIN — ASPIRIN 81 MG 324 MG: 81 TABLET ORAL at 10:59

## 2023-02-21 RX ADMIN — FUROSEMIDE 40 MG: 10 INJECTION, SOLUTION INTRAMUSCULAR; INTRAVENOUS at 11:02

## 2023-02-21 RX ADMIN — IOPAMIDOL 75 ML: 755 INJECTION, SOLUTION INTRAVENOUS at 10:28

## 2023-02-21 RX ADMIN — DEXTROSE MONOHYDRATE 25 G: 25 INJECTION, SOLUTION INTRAVENOUS at 18:02

## 2023-02-21 ASSESSMENT — PAIN - FUNCTIONAL ASSESSMENT: PAIN_FUNCTIONAL_ASSESSMENT: NONE - DENIES PAIN

## 2023-02-21 ASSESSMENT — PAIN DESCRIPTION - DESCRIPTORS: DESCRIPTORS: ACHING

## 2023-02-21 ASSESSMENT — PAIN DESCRIPTION - LOCATION: LOCATION: LEG;CHEST

## 2023-02-21 ASSESSMENT — PAIN SCALES - GENERAL: PAINLEVEL_OUTOF10: 5

## 2023-02-21 ASSESSMENT — LIFESTYLE VARIABLES
HOW OFTEN DO YOU HAVE A DRINK CONTAINING ALCOHOL: NEVER
HOW MANY STANDARD DRINKS CONTAINING ALCOHOL DO YOU HAVE ON A TYPICAL DAY: PATIENT DOES NOT DRINK

## 2023-02-21 ASSESSMENT — ENCOUNTER SYMPTOMS: SHORTNESS OF BREATH: 1

## 2023-02-21 NOTE — ED NOTES
Called [facility] transfer center and spoke with [facility].  Advised with fax FACE sheet to [phone]     Rashmi Deutsch  02/21/23 [id number]

## 2023-02-21 NOTE — ED NOTES
Called [facility] and spoke with for update on transfer, was advised that they are waiting on discharges to assign patient bed     Rashmi Deutsch  02/21/23 [id number]

## 2023-02-21 NOTE — ED NOTES
Critical Troponin 0.149 called by lab, Dr Delmar Farrell notified     Sharon Berg RN  02/21/23 [id number]

## 2023-02-21 NOTE — ED NOTES
Return call from [address] [place] [address] transfer center, faxed facesheet as requested, hospitalist from [address] [place] [address] transferred to Dr Leta Nava, RN  02/21/23 0883

## 2023-02-21 NOTE — ED PROVIDER NOTES
Triage Chief Complaint:   Shortness of Breath (Started Friday, last dose of chemo for breast cancer was Thursday, Dr Kulwant Oh referred pt to ed for ct to rule out PE. Pt was seen at Urgent care and chest xray showed pt was \"retaining fluid\")    [place]:  Karolyn Blanco is a 42 y.o. female that presents to the ED with complaint of shortness of breath rapid heart rate. Patient went to urgent care yesterday only received a chest x-ray. Patient is a 61-year-old delightful female diagnosed with breast cancer left breast December 1, 2022 currently undergoing chemotherapy through [facility]. She admits to to symptoms since Friday of shortness of breath went to urgent care as noted no chest pain. She has no previous history of DVT or PE. Breast cancer risk in her father's sister. The patient denies any ripping or tearing pain no cough no fever no chills no sputum production.   She does admit to swelling in her legs        Past Medical History:   Diagnosis Date    Allergic rhinitis     Asthma     Cancer ([facility] 12/01/2022    breast cancer, left    Diabetes mellitus type 1, controlled ([facility]      Past Surgical History:   Procedure Laterality Date    WISDOM TOOTH EXTRACTION       Family History   Problem Relation Age of Onset    Diabetes Mother      Social History     Socioeconomic History    Marital status:      Spouse name: Not on file    Number of children: Not on file    Years of education: Not on file    Highest education level: Not on file   Occupational History    Not on file   Tobacco Use    Smoking status: Never    Smokeless tobacco: Never   Vaping Use    Vaping Use: Never used   Substance and Sexual Activity    Alcohol use: No    Drug use: No    Sexual activity: Yes     Partners: Male   Other Topics Concern    Not on file   Social History Narrative    Not on file     Social Determinants of Health     Financial Resource Strain: Not on file   Food Insecurity: Not on file Transportation Needs: Not on file   Physical Activity: Not on file   Stress: Not on file   Social Connections: Not on file   Intimate Partner Violence: Not on file   Housing Stability: Not on file     No current facility-administered medications for this encounter. Current Outpatient Medications   Medication Sig Dispense Refill    furosemide (LASIX) 20 MG tablet Take 20 mg by mouth 2 times daily      sertraline (ZOLOFT) 25 MG tablet Take 1 tablet by mouth daily 90 tablet 1    Prenat w/o G-DtDj-VpRw-DSS-FA (CITRANATAL RX) 27-1 MG TABS Take 1 tablet by mouth daily      Prenatal Vit-Fe Fumarate-FA (PRENATAL VITAMIN PO) Take by mouth      HUMALOG 100 UNIT/ML injection vial UP TO 50 UNITS IN DIVIDED DOSES  3    BD INSULIN SYRINGE ULTRAFINE 31G X 5/16\" 0.3 ML MISC 1 SYRINGE BY SUBCUTANEOUS ROUTE 5 TIMES DAILY. . BD BRAND  3    glucose blood VI test strips (FREESTYLE LITE) strip 4 strips by Does not apply route      glucose blood VI test strips (ONE TOUCH ULTRA TEST) strip Testing 6 times a day      insulin glargine (LANTUS) 100 UNIT/ML injection vial Inject 25 Units into the skin nightly      Insulin Syringes, Disposable, U-100 0.3 ML MISC Inject 1 Syringe into the skin      ONETOUCH DELICA LANCETS FINE MISC Inject 1 each into the skin       No Known Allergies      ROS:    Review of Systems   Constitutional:  Positive for activity change and fatigue. Negative for appetite change and fever. Respiratory:  Positive for shortness of breath. Cardiovascular: Negative. Genitourinary:         Patient does admit to some decrease in urine output. Normal color   All other systems reviewed and are negative.     Nursing Notes Reviewed    Physical Exam:    /77   Pulse (!) 115   Temp 98.2 °F (36.8 °C) (Oral)   Resp 19   Ht 5' 5\" (1.651 m)   Wt 182 lb (82.6 kg)   LMP 01/01/2023   SpO2 98%   BMI 30.29 kg/m²      ED Triage Vitals [02/21/23 0937]   Enc Vitals Group      /87      Heart Rate (!) 135      Resp 18      Temp 98.2 °F (36.8 °C)      Temp Source Oral      SpO2 95 %      Weight 182 lb (82.6 kg)      Height 5' 5\" (1.651 m)      Head Circumference       Peak Flow       Pain Score       Pain Loc       Pain Edu? Excl. in [address]? Physical Exam  Vitals and nursing note reviewed. Constitutional:       Appearance: She is well-developed. She is ill-appearing. HENT:      Head: Normocephalic and atraumatic. Right Ear: External ear normal.      Left Ear: External ear normal.   Eyes:      General: No scleral icterus. Right eye: No discharge. Left eye: No discharge. Conjunctiva/sclera: Conjunctivae normal.      Pupils: Pupils are equal, round, and reactive to light. Neck:      Thyroid: No thyromegaly. Vascular: No JVD. Trachea: No tracheal deviation. Cardiovascular:      Rate and Rhythm: Regular rhythm. Tachycardia present. Pulses:           Carotid pulses are 2+ on the right side and 2+ on the left side. Radial pulses are 2+ on the right side and 2+ on the left side. Femoral pulses are 2+ on the right side and 2+ on the left side. Popliteal pulses are 2+ on the right side and 2+ on the left side. Dorsalis pedis pulses are 2+ on the right side and 2+ on the left side. Posterior tibial pulses are 2+ on the right side and 2+ on the left side. Heart sounds: Normal heart sounds. No murmur heard. No friction rub. No gallop. Pulmonary:      Effort: Pulmonary effort is normal. No respiratory distress. Breath sounds: No stridor. Examination of the right-lower field reveals decreased breath sounds. Examination of the left-lower field reveals decreased breath sounds. Decreased breath sounds present. No wheezing or rales. Chest:      Chest wall: No tenderness. Abdominal:      General: Abdomen is protuberant. Bowel sounds are normal. There is no distension. Palpations: Abdomen is soft. There is no mass.       Tenderness: There is no abdominal tenderness. There is no guarding or rebound. Hernia: No hernia is present. Comments: Edematous abdomen no pain no peritonitis   Musculoskeletal:         General: No tenderness or deformity. Normal range of motion. Cervical back: Normal range of motion and neck supple. Right lower le+ Pitting Edema present. Left lower le+ Pitting Edema present. Lymphadenopathy:      Cervical: No cervical adenopathy. Skin:     General: Skin is warm and dry. Coloration: Skin is not pale. Findings: No erythema or rash. Neurological:      General: No focal deficit present. Mental Status: She is alert and oriented to person, place, and time. Cranial Nerves: No cranial nerve deficit. Sensory: No sensory deficit. Deep Tendon Reflexes: Reflexes are normal and symmetric. Reflexes normal.   Psychiatric:         Mood and Affect: Mood normal.         Speech: Speech normal.         Behavior: Behavior normal.         Thought Content:  Thought content normal.         Judgment: Judgment normal.       I have reviewed and interpreted all of the currently available lab results from this visit (ifapplicable):  Results for orders placed or performed during the hospital encounter of 23   CBC with Auto Differential   Result Value Ref Range    WBC 2.8 (L) 4.0 - 10.5 K/CU MM    RBC 3.28 (L) 4.2 - 5.4 M/CU MM    Hemoglobin 9.4 (L) 12.5 - 16.0 GM/DL    Hematocrit 29.9 (L) 37 - 47 %    MCV 91.2 78 - 100 FL    MCH 28.7 27 - 31 PG    MCHC 31.4 (L) 32.0 - 36.0 %    RDW 17.5 (H) 11.7 - 14.9 %    Platelets 148 718 - 072 K/CU MM    MPV 11.4 (H) 7.5 - 11.1 FL    Differential Type AUTOMATED DIFFERENTIAL     Segs Relative 49.2 36 - 66 %    Lymphocytes % 43.3 24 - 44 %    Monocytes % 4.3 (H) 0 - 4 %    Eosinophils % 1.4 0 - 3 %    Basophils % 1.4 (H) 0 - 1 %    Segs Absolute 1.4 K/CU MM    Lymphocytes Absolute 1.2 K/CU MM    Monocytes Absolute 0.1 K/CU MM    Eosinophils Absolute 0.0 K/CU MM    Basophils Absolute 0.0 K/CU MM    Immature Neutrophil % 0.4 0 - 0.43 %    Total Immature Neutrophil 0.01 K/CU MM   CMP   Result Value Ref Range    Sodium 138 135 - 145 MMOL/L    Potassium 4.1 3.5 - 5.1 MMOL/L    Chloride 104 99 - 110 mMol/L    CO2 23 21 - 32 MMOL/L    BUN 18 6 - 23 MG/DL    Creatinine 0.6 0.6 - 1.1 MG/DL    Est, Glom Filt Rate >60 >60 mL/min/1.73m2    Glucose 244 (H) 70 - 99 MG/DL    Calcium 8.1 (L) 8.3 - 10.6 MG/DL    Albumin 3.2 (L) 3.4 - 5.0 GM/DL    Total Protein 5.3 (L) 6.4 - 8.2 GM/DL    Total Bilirubin 0.4 0.0 - 1.0 MG/DL    ALT 85 (H) 10 - 40 U/L     (H) 15 - 37 IU/L    Alkaline Phosphatase 123 40 - 129 IU/L    Anion Gap 11 4 - 16   Troponin   Result Value Ref Range    Troponin T 0.149 (HH) <0.01 NG/ML   Brain Natriuretic Peptide   Result Value Ref Range    Pro-BNP 5,600 (H) <300 PG/ML   Urinalysis   Result Value Ref Range    Color, UA YELLOW YELLOW    Clarity, UA CLEAR CLEAR    Glucose, Urine NEGATIVE NEGATIVE MG/DL    Bilirubin Urine NEGATIVE NEGATIVE MG/DL    Ketones, Urine NEGATIVE NEGATIVE MG/DL    Specific Gravity, UA 1.010 1.001 - 1.035    Blood, Urine TRACE (A) NEGATIVE    pH, Urine 5.5 5.0 - 8.0    Protein, UA NEGATIVE NEGATIVE MG/DL    Urobilinogen, Urine 0.2 0.2 - 1.0 MG/DL    Nitrite Urine, Quantitative NEGATIVE NEGATIVE    Leukocyte Esterase, Urine NEGATIVE NEGATIVE   Microscopic Urinalysis   Result Value Ref Range    RBC, UA 5 0 - 6 /HPF    WBC, UA 2 0 - 5 /HPF    Epithelial Cells, UA 2 /HPF    Cast Type NO CAST FORMS SEEN NO CAST FORMS SEEN /HPF    Bacteria, UA NEGATIVE NEGATIVE /HPF    Crystal Type NEGATIVE NEGATIVE /HPF   POC Blood Glucose   Result Value Ref Range    Glucose 42 mg/dL   POC Blood Glucose   Result Value Ref Range    Glucose 102 mg/dL    QC OK? ok    POCT Glucose   Result Value Ref Range    POC Glucose 42 (L) 70 - 99 MG/DL   POCT Glucose   Result Value Ref Range    POC Glucose 103 (H) 70 - 99 MG/DL   EKG 12 Lead   Result Value Ref Range    Ventricular Rate 126 BPM    Atrial Rate 126 BPM    P-R Interval 126 ms    QRS Duration 98 ms    Q-T Interval 304 ms    QTc Calculation (Bazett) 440 ms    P Axis 45 degrees    R Axis 63 degrees    T Axis 11 degrees    Diagnosis       Sinus tachycardia  Low voltage QRS  Cannot rule out Anterior infarct , age undetermined  Abnormal ECG  No previous ECGs available  Confirmed by [facility] MD, [facility] ([id number]) on 2/21/2023 5:47:34 PM        Radiographs (if obtained):  [] The following radiograph wasinterpreted by myself in the absence of a radiologist:   [] Radiologist's Report Reviewed:  CTA PULMONARY W CONTRAST   Preliminary Result   No pulmonary embolism is identified. Small to moderate-sized pericardial effusion. Small to moderate-sized bilateral pleural effusions, with compressive   atelectasis. Interlobular septal thickening bilaterally, suggesting interstitial pulmonary   edema. EKG (if obtained): (All EKG's are interpreted by myself in the absence of a cardiologist)      The 12 lead EKG was interpreted by me, and the interpretation is as follows:  sinus tachycardia, nrqr=709 , rate = 126. Intervals are within the normal range. QTc is not prolonged. ST elevations are not present. T wave inversions are not present. Non-specific T wave changes are present. Delta waves, Brugada Syndrome, and Short GA are not present. There is no acute ischemia. Low voltage  Flat T's throughout              Chart review shows recent radiographs:  No results found. MDM:      Patient presents to the ED with concern of increasing shortness of breath patient is receiving chemotherapy at the [facility] cancer treated [facility]. She noticed on Friday increasing weight gain edema to her legs she went to urgent care just yesterday and was just empirically placed on furosemide. She now presents ED. She was ill pale appearing.   She has significant edema to her legs fortunately renal function is normal no concern for nephrotic syndrome. Lung auscultation is normal anteriorly with isolated decreased breath sounds at extreme bases. CTA of the chest obtained rule out PE fortunately negative however she does have a moderate pericardial effusion as well as a small bilateral pleural effusion. Patient was initially planned to be transferred to [facility] pending bed availability but after 9 hours a call back there was no possibility for any open beds I do not believe it safe for the patient to stay here at the [place] [address] she elected to go to [facility] I spoke to the hospitalist was very kind generous excepted the patient to their service. Thank you very much        CRITICAL CARE NOTE:  There was a high probability of clinically significant life-threatening deterioration of the patient's condition requiring my urgent intervention due to increased work of breathing, and tachycardia. Order interpretation of labs and imaging, reevaluation was performed to address this. Total critical care time is AT LEAST 60 minutes   This includes vital sign monitoring, pulse oximetry monitoring, telemetry monitoring, clinical response to the IV medications, reviewing the nursing notes, consultation time, dictation/documentation time, and interpretation of the lab work. This time excludes time spent performing procedures and separately billable procedures and family discussion time. ED Course as of 02/21/23 1228   Tue Feb 21, 2023   [address]08 [address] [place] [address] notified. [PW]      ED Course User Index  [PW] Delfino Torres,        ED Course as of 02/21/23 1823   Tue Feb 21, 2023   1157 Case discussed with Dr. Black Larson; she wants pt at [facility] [PW]   [phone] After several hours of delay I was finally informed by [address] [place] [address] now there is no bed available. We are anticipating 1 opening up later this afternoon.   Patient request to go to [facility] I will call them to see if there is bed availability [PW]   [facility] notified  [PW]   [address] [employer]. ..thank you very much Dr Nacho Escobedo [PW]      ED Course User Index  [PW] Ezio Haynes DO         ED Course and Summary:     History from : Patient    Limitations to history : None    Patient was given the following medications:  Medications   iopamidol (ISOVUE-370) 76 % injection 100 mL (75 mLs IntraVENous Given 2/21/23 1028)   aspirin chewable tablet 324 mg (324 mg Oral Given 2/21/23 1059)   furosemide (LASIX) injection 40 mg (40 mg IntraVENous Given 2/21/23 1102)   dextrose 50 % IV solution (25 g IntraVENous Given 2/21/23 1802)       Imaging Interpretation by CTA per Rad; Mod pericardial effusion and bilateral pleural effusion       Chronic conditions affecting care: breast cancer    Discussion with Other Profesionals : Consultant Dr. Justyna Rodrigez Determinants : None    Records Reviewed : Source Epic    Disposition Considerations:   Transfer     I am the Primary Clinician of Record. Clinical Impression:  1. Pericardial effusion, acute    2. Pleural effusion    3. Malignant neoplasm of left female breast, unspecified estrogen receptor status, unspecified site of breast ([facility]    4. Hypoglycemia      Disposition referral (if applicable):  No follow-up provider specified. Disposition medications (if applicable):  New Prescriptions    No medications on file           Eliel Ferguson DO, FACEP      Comment: Please note this report has been produced using speech recognition software and maycontain errors related to that system including errors in grammar, punctuation, and spelling, as well as words and phrases that may be inappropriate. If there are any questions or concerns please feel free to contact thedictating provider for clarification.         Ezio Haynes DO  02/21/23 2137

## 2023-02-21 NOTE — ED NOTES
Called [facility] transfer center.  [facility] accepting Dr. Damaris Hare  02/21/23 7439

## 2023-02-21 NOTE — ED NOTES
Called [facility] for update. Was advised that they are on divert and no beds available tonight.  Called [facility] Transfer Center and spoke with Sadia Arshad , advised that doc would like to consult about possible transfer, transferred to Dr. Ala Councilman for further questions      Ivette Darnell  02/21/23 7526

## 2023-02-21 NOTE — ED NOTES
Called [facility] On[facility] and spoke with Renae Zamora , advised him that Dr. Jazzy Chapin would like consult with Jean Alexis MD. Provided callback number [phone]     Herve Wallis  02/21/23 6216

## 2023-02-22 NOTE — ED NOTES
S/W  [facility], verified patient room assignment Bed #625 - Report #[phone]     Javan Salvador  02/21/23 2005

## 2023-07-25 ENCOUNTER — OFFICE VISIT (OUTPATIENT)
Dept: FAMILY MEDICINE CLINIC | Age: 38
End: 2023-07-25
Payer: COMMERCIAL

## 2023-07-25 VITALS
BODY MASS INDEX: 32.08 KG/M2 | DIASTOLIC BLOOD PRESSURE: 68 MMHG | HEART RATE: 90 BPM | OXYGEN SATURATION: 97 % | RESPIRATION RATE: 16 BRPM | WEIGHT: 192.8 LBS | SYSTOLIC BLOOD PRESSURE: 108 MMHG | TEMPERATURE: 97.9 F

## 2023-07-25 DIAGNOSIS — U07.1 COVID-19: Primary | ICD-10-CM

## 2023-07-25 DIAGNOSIS — R09.81 NASAL CONGESTION: ICD-10-CM

## 2023-07-25 DIAGNOSIS — J02.9 SORE THROAT: ICD-10-CM

## 2023-07-25 DIAGNOSIS — R50.9 FEVER, UNSPECIFIED FEVER CAUSE: ICD-10-CM

## 2023-07-25 LAB
Lab: ABNORMAL
QC PASS/FAIL: ABNORMAL
S PYO AG THROAT QL: NORMAL
SARS-COV-2 RDRP RESP QL NAA+PROBE: POSITIVE

## 2023-07-25 PROCEDURE — 1036F TOBACCO NON-USER: CPT | Performed by: NURSE PRACTITIONER

## 2023-07-25 PROCEDURE — G8427 DOCREV CUR MEDS BY ELIG CLIN: HCPCS | Performed by: NURSE PRACTITIONER

## 2023-07-25 PROCEDURE — 87880 STREP A ASSAY W/OPTIC: CPT | Performed by: NURSE PRACTITIONER

## 2023-07-25 PROCEDURE — 99213 OFFICE O/P EST LOW 20 MIN: CPT | Performed by: NURSE PRACTITIONER

## 2023-07-25 PROCEDURE — 87635 SARS-COV-2 COVID-19 AMP PRB: CPT | Performed by: NURSE PRACTITIONER

## 2023-07-25 PROCEDURE — G8417 CALC BMI ABV UP PARAM F/U: HCPCS | Performed by: NURSE PRACTITIONER

## 2023-07-25 SDOH — ECONOMIC STABILITY: INCOME INSECURITY: HOW HARD IS IT FOR YOU TO PAY FOR THE VERY BASICS LIKE FOOD, HOUSING, MEDICAL CARE, AND HEATING?: NOT HARD AT ALL

## 2023-07-25 SDOH — ECONOMIC STABILITY: FOOD INSECURITY: WITHIN THE PAST 12 MONTHS, THE FOOD YOU BOUGHT JUST DIDN'T LAST AND YOU DIDN'T HAVE MONEY TO GET MORE.: NEVER TRUE

## 2023-07-25 SDOH — ECONOMIC STABILITY: FOOD INSECURITY: WITHIN THE PAST 12 MONTHS, YOU WORRIED THAT YOUR FOOD WOULD RUN OUT BEFORE YOU GOT MONEY TO BUY MORE.: NEVER TRUE

## 2023-07-25 SDOH — ECONOMIC STABILITY: HOUSING INSECURITY
IN THE LAST 12 MONTHS, WAS THERE A TIME WHEN YOU DID NOT HAVE A STEADY PLACE TO SLEEP OR SLEPT IN A SHELTER (INCLUDING NOW)?: NO

## 2023-07-25 ASSESSMENT — PATIENT HEALTH QUESTIONNAIRE - PHQ9
SUM OF ALL RESPONSES TO PHQ QUESTIONS 1-9: 0
SUM OF ALL RESPONSES TO PHQ9 QUESTIONS 1 & 2: 0
2. FEELING DOWN, DEPRESSED OR HOPELESS: 0
1. LITTLE INTEREST OR PLEASURE IN DOING THINGS: 0
SUM OF ALL RESPONSES TO PHQ QUESTIONS 1-9: 0

## 2023-07-25 ASSESSMENT — ENCOUNTER SYMPTOMS
COUGH: 1
EYES NEGATIVE: 1
WHEEZING: 0
SINUS PRESSURE: 1
SWOLLEN GLANDS: 0
SORE THROAT: 1
DIARRHEA: 0
GASTROINTESTINAL NEGATIVE: 1
ABDOMINAL PAIN: 0
VOMITING: 0
SHORTNESS OF BREATH: 0
VOICE CHANGE: 1
RHINORRHEA: 1
NAUSEA: 0
SINUS PAIN: 1

## 2023-07-25 NOTE — PATIENT INSTRUCTIONS
We are committed to providing you the best care possible. If you receive a survey after visiting one of our offices, please take time to share your experience concerning your physician office visit. These surveys are confidential and no health information about you is shared. We are eager to improve for you and continue to give you satisfactory care, we are counting on your feedback to help make that happen. Welcome to [address] [place] [address] and Pediatrics:    Did you know we now have a faster way for you to move through your appointment? For your convenience, we now have digital registration available. When you schedule your next appointment, you will receive a link via your email as well as a text message that will allow you to complete any paperwork digitally before your appointment.      Please See Below a List of Family Medicine Providers:    Dr. Chava Kelley  [address] [place] [address] NP  81 [facility]    Internal Medicine  Dr Lisa Pitts  [address] [place] [address]  [phone]    Dr. Ramirez [address]    Dr. Scott Lopez 10[address] [facility], Nohemy Sage NP  [address] [place] [address] [address]  [address] NP  [facility], [facility]